# Patient Record
Sex: MALE | Race: WHITE | NOT HISPANIC OR LATINO | Employment: OTHER | URBAN - METROPOLITAN AREA
[De-identification: names, ages, dates, MRNs, and addresses within clinical notes are randomized per-mention and may not be internally consistent; named-entity substitution may affect disease eponyms.]

---

## 2017-09-13 ENCOUNTER — OFFICE VISIT (OUTPATIENT)
Dept: CARDIAC SURGERY | Facility: CLINIC | Age: 62
End: 2017-09-13

## 2017-09-13 VITALS
HEART RATE: 90 BPM | DIASTOLIC BLOOD PRESSURE: 86 MMHG | SYSTOLIC BLOOD PRESSURE: 139 MMHG | OXYGEN SATURATION: 97 % | TEMPERATURE: 98.3 F | WEIGHT: 167 LBS | HEIGHT: 70 IN | BODY MASS INDEX: 23.91 KG/M2

## 2017-09-13 DIAGNOSIS — R91.1 LUNG NODULE: ICD-10-CM

## 2017-09-13 DIAGNOSIS — R22.1 MASS OF RIGHT SIDE OF NECK: Primary | ICD-10-CM

## 2017-09-13 DIAGNOSIS — R91.8 LUNG MASS: Primary | ICD-10-CM

## 2017-09-13 DIAGNOSIS — R22.1 NECK MASS: ICD-10-CM

## 2017-09-13 DIAGNOSIS — R22.2 MASS, CHEST: ICD-10-CM

## 2017-09-13 PROCEDURE — 94010 BREATHING CAPACITY TEST: CPT | Performed by: THORACIC SURGERY (CARDIOTHORACIC VASCULAR SURGERY)

## 2017-09-13 PROCEDURE — 99205 OFFICE O/P NEW HI 60 MIN: CPT | Performed by: THORACIC SURGERY (CARDIOTHORACIC VASCULAR SURGERY)

## 2017-09-13 NOTE — PROGRESS NOTES
09/13/2017  Patient Information  Richar Garsia                                                                                          [address] [facility] [place] [address]   1955  'PCP/Referring Physician'  Greg Moncada MD  [phone]  No ref. provider found    Chief Complaint   Patient presents with   • Consult     Complains of having a lung mass and shortness of breath.   • Lung Nodule     CHIEF COMPLAINT:   Shortness of breath, neck mass and lung mass    History of Present Illness:  Mr. Garsia is a 62 year old white male who has been referred at this time by Dr. Moncada.  He has been found to have a large mass in his right neck.  This has affected his swallowing.  He has been a smoker.  He also has been found to have a 2.1 cm mass in his lung.  He has been referred for evaluation of this.  He has had the swallowing problems, otherwise, he has been doing reasonably well.  He has been disabled for several years.    Patient Active Problem List   Diagnosis   • Lung mass   • Neck mass     Past Medical History:   Diagnosis Date   • Arthritis    • Bipolar disorder    • COPD (chronic obstructive pulmonary disease)    • Degenerative disc disease, lumbar    • Depression    • Dyslipidemia    • Hypertension      History reviewed. No pertinent surgical history.  No current outpatient prescriptions on file.  No Known Allergies  Social History     Social History   • Marital status:      Spouse name: N/A   • Number of children: N/A   • Years of education: N/A     Occupational History   • Not on file.     Social History Main Topics   • Smoking status: Current Every Day Smoker     Packs/day: 2.00     Years: 42.00     Types: Cigarettes   • Smokeless tobacco: Never Used   • Alcohol use No   • Drug use: No   • Sexual activity: Not on file     Other Topics Concern   • Not on file     Social History Narrative   • No narrative on file     Family History   Problem Relation Age of Onset   • Stroke Mother    •  "Hypertension Mother    • Heart attack Father    • Hypertension Father    • Lung cancer Brother    • Colon cancer Brother      Review of Systems   Constitution: Positive for malaise/fatigue. Negative for chills, fever, night sweats and weight loss.   HENT: Positive for hearing loss and sore throat. Negative for headaches and odynophagia.    Eyes: Negative.    Cardiovascular: Negative.  Negative for chest pain, dyspnea on exertion, leg swelling, orthopnea and palpitations.   Respiratory: Positive for shortness of breath. Negative for cough and hemoptysis.    Endocrine: Negative.  Negative for cold intolerance, heat intolerance, polydipsia, polyphagia and polyuria.   Hematologic/Lymphatic: Bruises/bleeds easily.   Skin: Negative.  Negative for itching and rash.   Musculoskeletal: Positive for arthritis, back pain, joint pain and muscle cramps. Negative for joint swelling and myalgias.   Gastrointestinal: Positive for dysphagia. Negative for abdominal pain, constipation, diarrhea, hematemesis, hematochezia, melena, nausea and vomiting.   Genitourinary: Negative.  Negative for dysuria, frequency and hematuria.   Neurological: Negative.  Negative for focal weakness, numbness and seizures.   Psychiatric/Behavioral: Positive for depression. Negative for suicidal ideas.   Allergic/Immunologic: Negative.    All other systems reviewed and are negative.    Vitals:    09/13/17 1251   BP: 139/86   BP Location: Right arm   Pulse: 90   Temp: 98.3 °F (36.8 °C)   SpO2: 97%   Weight: 167 lb (75.8 kg)   Height: 70\" (177.8 cm)      Physical Exam  GENERAL APPEARANCE: Oriented x3, well-nourished, well developed, in no acute distress.  EYES:  Eyes anicteric.  EOMI.  PERRLA.   ENT:              Good dentition.  NECK:  A large mass in the right side of his neck which is firm.  It is nontender.  LUNGS:        Decreased in the bases; no wheezing, rales or rhonchi.  CARDIOVASCULAR:  Regular rate and rhythm without murmur, rub or gallop.  There " are no carotid bruits.  GI:  Abdomen is soft, nontender, nondistended with normal bowel sounds.  No hepatosplenomegaly and no masses.  EXTREMITIES:  No cyanosis, clubbing or edema.  SKIN:  No ulcerations, petechiae or bruising.  MUSCULOSKELETAL:  Full range of motion with no deformity of extremities.  NEURO: Cranial nerves II-XII, motor and sensory exams are intact.  PSYCH: Alert and oriented; mood and affect good.    Labs/Imaging:  I obtained and reviewed medical records from Dr. Moncada's office including CT scan of the neck and chest demonstrating the nodules as discussed above.    Assessment/Plan:  Mr. Garsia is a 62 year old, very complex patient who has history of bipolar disorder, dyslipidemia, hypertension, COPD and continuing tobacco abuse.  He now has a large right mass in the right neck.  He also has a mass in his right lung.  Certainly these are very concerning for malignancy. I have recommended that we get a needle biopsy of his neck mass followed by a PET scan to further assess this.  I am concerned about possible metastatic disease.  I have had a long discussion with the patient as well as his daughter and have answered all of their questions.  We will proceed with the surgery.  He is aware of the procedure and risks.       Patient Active Problem List   Diagnosis   • Lung mass   • Neck mass     Signed by: Mike Lind M.D.    9/13/2017    CC:  MD Joanna Roman transcribing on behalf of Mike Lind MD dictating.

## 2017-09-14 PROBLEM — R22.1 NECK MASS: Status: ACTIVE | Noted: 2017-09-14

## 2017-09-14 PROBLEM — R91.8 LUNG MASS: Status: ACTIVE | Noted: 2017-09-14

## 2017-09-14 RX ORDER — ARIPIPRAZOLE 15 MG/1
15 TABLET ORAL DAILY
COMMUNITY

## 2017-09-14 RX ORDER — HYDROCODONE BITARTRATE AND ACETAMINOPHEN 10; 325 MG/1; MG/1
1 TABLET ORAL
COMMUNITY

## 2017-09-14 RX ORDER — MONTELUKAST SODIUM 10 MG/1
10 TABLET ORAL NIGHTLY
COMMUNITY

## 2017-09-14 RX ORDER — RISPERIDONE 1 MG/1
1 TABLET ORAL DAILY
COMMUNITY

## 2017-09-14 RX ORDER — NABUMETONE 750 MG/1
750 TABLET, FILM COATED ORAL 2 TIMES DAILY PRN
COMMUNITY

## 2017-09-14 RX ORDER — OLANZAPINE 10 MG/1
10 TABLET ORAL NIGHTLY
COMMUNITY

## 2017-09-14 RX ORDER — ALBUTEROL SULFATE 90 UG/1
2 AEROSOL, METERED RESPIRATORY (INHALATION) EVERY 4 HOURS PRN
COMMUNITY

## 2017-09-14 RX ORDER — VENLAFAXINE HYDROCHLORIDE 150 MG/1
150 CAPSULE, EXTENDED RELEASE ORAL 2 TIMES DAILY
COMMUNITY